# Patient Record
Sex: MALE | Race: WHITE | NOT HISPANIC OR LATINO | URBAN - METROPOLITAN AREA
[De-identification: names, ages, dates, MRNs, and addresses within clinical notes are randomized per-mention and may not be internally consistent; named-entity substitution may affect disease eponyms.]

---

## 2019-07-16 ENCOUNTER — EMERGENCY (EMERGENCY)
Facility: HOSPITAL | Age: 24
LOS: 0 days | Discharge: HOME | End: 2019-07-16
Attending: EMERGENCY MEDICINE | Admitting: EMERGENCY MEDICINE
Payer: COMMERCIAL

## 2019-07-16 VITALS
DIASTOLIC BLOOD PRESSURE: 92 MMHG | WEIGHT: 164.69 LBS | SYSTOLIC BLOOD PRESSURE: 155 MMHG | RESPIRATION RATE: 22 BRPM | OXYGEN SATURATION: 98 % | HEART RATE: 81 BPM | TEMPERATURE: 98 F

## 2019-07-16 VITALS
SYSTOLIC BLOOD PRESSURE: 132 MMHG | DIASTOLIC BLOOD PRESSURE: 80 MMHG | RESPIRATION RATE: 15 BRPM | HEART RATE: 67 BPM | OXYGEN SATURATION: 100 % | TEMPERATURE: 98 F

## 2019-07-16 DIAGNOSIS — R20.0 ANESTHESIA OF SKIN: ICD-10-CM

## 2019-07-16 DIAGNOSIS — Z82.3 FAMILY HISTORY OF STROKE: ICD-10-CM

## 2019-07-16 LAB
ALBUMIN SERPL ELPH-MCNC: 4.6 G/DL — SIGNIFICANT CHANGE UP (ref 3.3–5)
ALP SERPL-CCNC: 56 U/L — SIGNIFICANT CHANGE UP (ref 40–120)
ALT FLD-CCNC: 26 U/L — SIGNIFICANT CHANGE UP (ref 12–78)
ANION GAP SERPL CALC-SCNC: 8 MMOL/L — SIGNIFICANT CHANGE UP (ref 5–17)
APTT BLD: 30.1 SEC — SIGNIFICANT CHANGE UP (ref 27.5–36.3)
AST SERPL-CCNC: 21 U/L — SIGNIFICANT CHANGE UP (ref 15–37)
BASOPHILS # BLD AUTO: 0.05 K/UL — SIGNIFICANT CHANGE UP (ref 0–0.2)
BASOPHILS NFR BLD AUTO: 1 % — SIGNIFICANT CHANGE UP (ref 0–2)
BILIRUB SERPL-MCNC: 0.9 MG/DL — SIGNIFICANT CHANGE UP (ref 0.2–1.2)
BUN SERPL-MCNC: 13 MG/DL — SIGNIFICANT CHANGE UP (ref 7–23)
CALCIUM SERPL-MCNC: 9.4 MG/DL — SIGNIFICANT CHANGE UP (ref 8.5–10.1)
CHLORIDE SERPL-SCNC: 102 MMOL/L — SIGNIFICANT CHANGE UP (ref 96–108)
CO2 SERPL-SCNC: 26 MMOL/L — SIGNIFICANT CHANGE UP (ref 22–31)
CREAT SERPL-MCNC: 1.28 MG/DL — SIGNIFICANT CHANGE UP (ref 0.5–1.3)
EOSINOPHIL # BLD AUTO: 0.07 K/UL — SIGNIFICANT CHANGE UP (ref 0–0.5)
EOSINOPHIL NFR BLD AUTO: 1.4 % — SIGNIFICANT CHANGE UP (ref 0–6)
GLUCOSE SERPL-MCNC: 102 MG/DL — HIGH (ref 70–99)
HCT VFR BLD CALC: 51.7 % — HIGH (ref 39–50)
HGB BLD-MCNC: 18.5 G/DL — HIGH (ref 13–17)
IMM GRANULOCYTES NFR BLD AUTO: 0.4 % — SIGNIFICANT CHANGE UP (ref 0–1.5)
INR BLD: 1.02 RATIO — SIGNIFICANT CHANGE UP (ref 0.88–1.16)
LYMPHOCYTES # BLD AUTO: 1.77 K/UL — SIGNIFICANT CHANGE UP (ref 1–3.3)
LYMPHOCYTES # BLD AUTO: 36 % — SIGNIFICANT CHANGE UP (ref 13–44)
MAGNESIUM SERPL-MCNC: 2.2 MG/DL — SIGNIFICANT CHANGE UP (ref 1.6–2.6)
MCHC RBC-ENTMCNC: 29.1 PG — SIGNIFICANT CHANGE UP (ref 27–34)
MCHC RBC-ENTMCNC: 35.8 GM/DL — SIGNIFICANT CHANGE UP (ref 32–36)
MCV RBC AUTO: 81.4 FL — SIGNIFICANT CHANGE UP (ref 80–100)
MONOCYTES # BLD AUTO: 0.31 K/UL — SIGNIFICANT CHANGE UP (ref 0–0.9)
MONOCYTES NFR BLD AUTO: 6.3 % — SIGNIFICANT CHANGE UP (ref 2–14)
NEUTROPHILS # BLD AUTO: 2.7 K/UL — SIGNIFICANT CHANGE UP (ref 1.8–7.4)
NEUTROPHILS NFR BLD AUTO: 54.9 % — SIGNIFICANT CHANGE UP (ref 43–77)
PLATELET # BLD AUTO: 242 K/UL — SIGNIFICANT CHANGE UP (ref 150–400)
POTASSIUM SERPL-MCNC: 4 MMOL/L — SIGNIFICANT CHANGE UP (ref 3.5–5.3)
POTASSIUM SERPL-SCNC: 4 MMOL/L — SIGNIFICANT CHANGE UP (ref 3.5–5.3)
PROT SERPL-MCNC: 8 GM/DL — SIGNIFICANT CHANGE UP (ref 6–8.3)
PROTHROM AB SERPL-ACNC: 11.3 SEC — SIGNIFICANT CHANGE UP (ref 10–12.9)
RBC # BLD: 6.35 M/UL — HIGH (ref 4.2–5.8)
RBC # FLD: 12.1 % — SIGNIFICANT CHANGE UP (ref 10.3–14.5)
SODIUM SERPL-SCNC: 136 MMOL/L — SIGNIFICANT CHANGE UP (ref 135–145)
TROPONIN I SERPL-MCNC: <0.015 NG/ML — SIGNIFICANT CHANGE UP (ref 0.01–0.04)
WBC # BLD: 4.92 K/UL — SIGNIFICANT CHANGE UP (ref 3.8–10.5)
WBC # FLD AUTO: 4.92 K/UL — SIGNIFICANT CHANGE UP (ref 3.8–10.5)

## 2019-07-16 PROCEDURE — 93010 ELECTROCARDIOGRAM REPORT: CPT

## 2019-07-16 PROCEDURE — 70496 CT ANGIOGRAPHY HEAD: CPT | Mod: 26

## 2019-07-16 PROCEDURE — 99284 EMERGENCY DEPT VISIT MOD MDM: CPT

## 2019-07-16 PROCEDURE — 70498 CT ANGIOGRAPHY NECK: CPT | Mod: 26

## 2019-07-16 PROCEDURE — 99282 EMERGENCY DEPT VISIT SF MDM: CPT

## 2019-07-16 NOTE — ED ADULT TRIAGE NOTE - CHIEF COMPLAINT QUOTE
Pt presents to ED complaining of sudden onset left sided numbness and tingling. Pt states he stood up at work and noticed onset of symptoms. Denies chest pain or SOB, blurry vision or slurred speech. Dr. Mittal in pivot assessing patient. .

## 2019-07-16 NOTE — CONSULT NOTE ADULT - ATTENDING COMMENTS
24 year old man presents with transient, left sided numbness, arm, lasted few seconds. Exam is unremarkable. CT of head showed no acute changes  Possible anxiety in view of father recent CVA.  Can f/u with me as outpatient in 1-2 weeks.

## 2019-07-16 NOTE — ED ADULT NURSE NOTE - CAS EDN DISCHARGE ASSESSMENT
Symptoms improved/Patient baseline mental status/Awake/Alert and oriented to person, place and time/No adverse reaction to first time med in ED

## 2019-07-16 NOTE — ED PROVIDER NOTE - PROGRESS NOTE DETAILS
carol: PT seen and reassessed.  Patient symptomatically improved.   AAOX3, NAD, VSS.  Discussed test results w/ patient. Patient verbalized understanding of hospital course and outpatient plans, has decisional making capacity.  Will f/u w/ pmd in the next few days; patient will call for an appointment. Will return to the ED if there is any worsening of symptoms.  Patient able to ambulate at baseline, is tolerating PO intake symptoms resolved so no tpa given

## 2019-07-16 NOTE — ED PROVIDER NOTE - CARE PROVIDER_API CALL
Blair Grubbs)  Internal Medicine; Neurology  [address] [facility], [address]  [place] [address]  Phone: [phone]  Fax: [phone]  Follow Up Time:

## 2019-07-16 NOTE — ED ADULT NURSE NOTE - CHPI ED NUR SYMPTOMS NEG
no fever/no blurred vision/no dizziness/no change in level of consciousness/no nausea/no vomiting/no weakness/no loss of consciousness/no confusion

## 2019-07-16 NOTE — CONSULT NOTE ADULT - ASSESSMENT
23 yo Right handed male with a PMH of palpitations presents to the ER with c/o acute onset of Left face / arm numbness at 09:45 this morning. CT/CTA in ER neg for acute path.     Ddx TIA vs anxiety vs cardiac event    - No IV tpa given because NIH 0  - Can give 325mg ASA now  - MRI as outpt   - Cardiology eval  - Dysphagia screen  - PT eval  - Speech/swallow eval    Discussed with ER attending / Dr Grubbs    Total Critical Care Time spent: 50 minutes

## 2019-07-16 NOTE — ED PROVIDER NOTE - CLINICAL SUMMARY MEDICAL DECISION MAKING FREE TEXT BOX
numbness and paresthesia along left fifth digit and left side of face since 9.45am so code stroke called. however symptoms full resolved around 10.45am so no tpa given. pt anxious that he mightve had a cva. neuro rec outpt f/u

## 2019-07-16 NOTE — ED PROVIDER NOTE - PHYSICAL EXAMINATION
*GEN: NAD; well appearing; A+O x3   *HEAD: NC/AT   *EYES/NOSE: PERRL & EOMI b/l  *THROAT: airway patent, moist mucous membranes  *NECK: Neck supple, no masses  *PULMONARY: CTA b/l, symmetric breath sounds.   *CARDIAC: s1s2, regular rhythm, no Murmur  *ABDOMEN:  ND, NT, soft, no guarding, no rebound, no masses   *BACK: no CVA tenderness, Normal  spine   *EXTREMITIES: symmetric pulses, 2+ dp & radial pulses, capillary refill < 2 seconds, no cyanosis, no edema   *SKIN: no rash or bruising   *NEUROLOGIC: CN 2-12 intact, normal finger to nose & heel to shin; no dysdiadochokinesis; full active & passive ROM in all extremeties,  no pronator drift, normal patellar reflex, normal gait, romberg sign negative, left decreased sensation left side of face and left fifth digit, NIH scale 1   *PSYCH: insight and judgment nl, memory nl, affect nl, thought nl

## 2019-07-16 NOTE — ED PROVIDER NOTE - NSFOLLOWUPINSTRUCTIONS_ED_ALL_ED_FT
FOLLOW UP WITH PMD & NEUROLOGIST DR GREGORIO WITHIN 5-7DAYS, CALL TO MAKE APPOINTMENT  COME BACK TO ED IF YOUR CONDITION WORSENS OR IF YOU DEVELOP FEVER GREATER THAN 100.4F, CHEST PAIN,  SHORTNESS OF BREATH OR ANY OTHER SYMPTOMS CONCERNING TO YOU  TAKE TYLENOL (ACETAMINOPHEN) 650 MG EVERY 6 HOURS AS NEEDED FOR PAIN  TAKE IBUPROFEN (MOTRIN)  600 MG EVERY 6 HOURS AS NEEDED FOR PAIN  IF YOU WERE PRESRCIBED ANY MEDICATIONS FROM TODAY'S VISIT, TAKE THEM AS DIRECTED

## 2019-07-16 NOTE — ED PROVIDER NOTE - OBJECTIVE STATEMENT
Pertinent HPI/PMH/PSH/FHx/SHx and Review of Systems contained within  HPI:  23yo  p/w CC left facial numbness and LUE numbness onset 40min ago. Sx are improving but still numbness and paresthesia along left fifth digit and left side of face. Had subjective facial droop but improved.  PMH/PSH: no medical problems  ROS positive for:  numbness and paresthesia along left fifth digit and left side of face  ROS negative for: fever, Chest pain, SOB, Nausea, vomiting, diarrhea, abdominal pain, dysuria, HA, neck pain    FamilyHx of CVA and SocialHx not otherwise contributory Pertinent HPI/PMH/PSH/FHx/SHx and Review of Systems contained within  HPI:  23yo  p/w CC left facial numbness and LUE numbness since 9.45a, new onset. As per pt, he was at work typing when he noted numbness to the Left corner of his mouth progressing to include his Left arm / hand. pt felt anxious during this episode & acute on chronic palpitations. Had subjective facial droop but improved. on my eval Sx are improving but still numbness and paresthesia along left fifth digit and left side of face  PMH/PSH: no medical problems  ROS negative for: fever, Chest pain, SOB, Nausea, vomiting, diarrhea, abdominal pain, dysuria, HA, neck pain    FamilyHx of CVA in dad  SocialHx not otherwise contributory

## 2019-07-16 NOTE — ED ADULT NURSE NOTE - OBJECTIVE STATEMENT
pt c/o left sided facial numbness and LUE numbness and tingling that began approximately 40mins ago. pt reports the facial numbness has since resolved but the LUE numbness and tingling is still present. reports lightheadedness but denies blurry vision. Pt able to move all extremities fully. No neurological deficits noted, no slurred speech evident.

## 2019-07-16 NOTE — ED PROVIDER NOTE - CARE PROVIDERS DIRECT ADDRESSES
[email or web address]

## 2019-07-16 NOTE — ED PROVIDER NOTE - NS ED ROS FT
Review of Systems:  	•	CONSTITUTIONAL: no fever  	•	SKIN: no rash  	•	RESPIRATORY: no shortness of breath  	•	CARDIAC: no chest pain, no palpitations  	•	GI:  no abd pain, no nausea, no vomiting, no diarrhea  	•	GENITO-URINARY:  no dysuria; no hematuria    	•	MUSCULOSKELETAL:  no back pain  	•	NEUROLOGIC: +left facial droop, +left fifth digit numbness  	•	PSYSCHIATRIC: no anxiety Review of Systems:  	•	CONSTITUTIONAL: no fever  	•	SKIN: no rash  	•	RESPIRATORY: no shortness of breath  	•	CARDIAC: no chest pain, no palpitations  	•	GI:  no abd pain, no nausea, no vomiting, no diarrhea  	•	GENITO-URINARY:  no dysuria; no hematuria    	•	MUSCULOSKELETAL:  no back pain  	•	NEUROLOGIC: + left fifth digit numbness  	•	PSYSCHIATRIC: no anxiety

## 2019-07-16 NOTE — CONSULT NOTE ADULT - SUBJECTIVE AND OBJECTIVE BOX
Patient is a 25 yo  Male who presents with a chief complaint of Left face and arm numbness    HPI:  25 yo Right handed male with a PMH of palpitations presents to the ER with c/o acute onset of Left face / arm numbness at 09:45 this morning. As per pt, he was at work typing when he noted numbness to the Left corner of his mouth progressing to include his Left arm / hand (LKWT=09:45) with an associated "pounding in his chest" as per pt's mother at bedside. He told a coworker he wasn't feeling well and felt his speech may have been slurred at that time. He was able to walk without difficulty to go outside and eat a banana. When symptoms did not fully resolve he came to ER for evaluation. CT/CTA in ER neg for acute path. Pt seen and examined directly after CT and at that time symptoms had resolved. At the time of my exam pt appears to be comfortable, in NAD. Pt denies HA, visual changes, focal numb / ting / weak, word finding difficulty, neck stiffness, photophobia.          PAST MEDICAL & SURGICAL HISTORY:  No significant past surgical history        FAMILY HISTORY:  Family history of stroke (Father): father had stroke 5/2019          Social Hx:  Nonsmoker, no drug or alcohol use        Allergies  penicillins (Unknown)        MEDICATIONS reviewed         ROS: Pertinent positives in HPI, all other ROS were reviewed and are negative.          Vital Signs Last 24 Hrs  T(C): 36.6 (16 Jul 2019 10:24), Max: 36.6 (16 Jul 2019 10:24)  T(F): 97.8 (16 Jul 2019 10:24), Max: 97.8 (16 Jul 2019 10:24)  HR: 81 (16 Jul 2019 10:24) (81 - 81)  BP: 155/92 (16 Jul 2019 10:24) (155/92 - 155/92)  RR: 22 (16 Jul 2019 10:24) (22 - 22)  SpO2: 98% (16 Jul 2019 10:24) (98% - 98%)        Labs:                        18.5   4.92  )-----------( 242      ( 16 Jul 2019 10:07 )             51.7     136  |  102  |  13  ----------------------------<  102<H>  4.0   |  26  |  1.28    Ca    9.4      16 Jul 2019 10:07  Mg     2.2     07-16    TPro  8.0  /  Alb  4.6  /  TBili  0.9  /  DBili  x   /  AST  21  /  ALT  26  /  AlkPhos  56  07-16    PT/INR - ( 16 Jul 2019 10:07 )   PT: 11.3 sec;   INR: 1.02 ratio      PTT - ( 16 Jul 2019 10:07 )  PTT:30.1 sec        Radiology report:  CT Brain Stroke Protocol (07.16.19 @ 10:06) >      1.   Right carotid system:  No hemodynamically significant stenosis.        2.   Left carotid system:  No hemodynamically significant stenosis.        3.   Intracranial circulation:  No significant vascular lesion.        4.   Brain:  No significant lesion identified.          Physical Exam:  Constitutional: Awake / alert  HEENT: PERRLA, EOMI  Neck: Supple  Respiratory: Breath sounds are clear bilaterally  Cardiovascular: S1 and S2, regular rhythm  Gastrointestinal: Soft, NT/ND  Extremities:  no edema  Vascular: No carotid Bruit  Musculoskeletal: no joint swelling/tenderness, no abnormal movements  Skin: No rashes    Neurological Exam:  HF: A x O x 3, appropriately interactive, normal affect, speech fluent, no aphasia or paraphasic errors. Naming /repetition intact   CN: PERRL, EOMI, VFF, facial sensation normal, no NLFD, tongue midline  Motor: No pronator drift, Strength 5/5 in all 4 ext, normal bulk and tone, no tremor, rigidity or bradykinesia  Sens: Intact to light touch  Reflexes: Symmetric and normal, downgoing toes b/l  Coord:  No FNFA, dysmetria, TIFFANY intact   Gait/Balance: Cannot test

## 2019-08-14 ENCOUNTER — APPOINTMENT (OUTPATIENT)
Dept: NEUROLOGY | Facility: CLINIC | Age: 24
End: 2019-08-14

## 2021-02-24 ENCOUNTER — TRANSCRIPTION ENCOUNTER (OUTPATIENT)
Age: 26
End: 2021-02-24

## 2021-04-08 ENCOUNTER — TRANSCRIPTION ENCOUNTER (OUTPATIENT)
Age: 26
End: 2021-04-08

## 2021-05-04 ENCOUNTER — TRANSCRIPTION ENCOUNTER (OUTPATIENT)
Age: 26
End: 2021-05-04

## 2021-09-07 ENCOUNTER — TRANSCRIPTION ENCOUNTER (OUTPATIENT)
Age: 26
End: 2021-09-07

## 2024-07-26 NOTE — ED PROVIDER NOTE - NIH STROKE SCALE: 5B. MOTOR ARM, RIGHT, QM
Per alert from [facility]hoana @ [facility] blood bank, hgb 6.9. Per Dr. Frey, 1 unit PRBC. OP Infusion not available until next week, per Dr. Frey, pt cannot wait until then, called pt, spoke to spouse, and advised to go to ED for transfusion. Pt and spouse state understanding.   
(0) No drift; limb holds 90 (or 45) degrees for full 10 secs
